# Patient Record
Sex: FEMALE | Race: BLACK OR AFRICAN AMERICAN | NOT HISPANIC OR LATINO | Employment: FULL TIME | URBAN - METROPOLITAN AREA
[De-identification: names, ages, dates, MRNs, and addresses within clinical notes are randomized per-mention and may not be internally consistent; named-entity substitution may affect disease eponyms.]

---

## 2018-02-08 DIAGNOSIS — Z12.31 ENCOUNTER FOR SCREENING MAMMOGRAM FOR BREAST CANCER: Primary | ICD-10-CM

## 2018-08-13 ENCOUNTER — HOSPITAL ENCOUNTER (EMERGENCY)
Facility: HOSPITAL | Age: 63
Discharge: HOME | End: 2018-08-13
Attending: FAMILY MEDICINE
Payer: MEDICAID

## 2018-08-13 VITALS
RESPIRATION RATE: 16 BRPM | TEMPERATURE: 98 F | WEIGHT: 210 LBS | HEART RATE: 62 BPM | DIASTOLIC BLOOD PRESSURE: 78 MMHG | OXYGEN SATURATION: 98 % | BODY MASS INDEX: 31.93 KG/M2 | SYSTOLIC BLOOD PRESSURE: 160 MMHG

## 2018-08-13 DIAGNOSIS — I10 HTN (HYPERTENSION): ICD-10-CM

## 2018-08-13 DIAGNOSIS — Z91.148 NONCOMPLIANCE WITH MEDICATION REGIMEN: ICD-10-CM

## 2018-08-13 DIAGNOSIS — I10 ESSENTIAL HYPERTENSION: Primary | ICD-10-CM

## 2018-08-13 LAB
ALBUMIN SERPL BCP-MCNC: 4.5 G/DL
ALP SERPL-CCNC: 112 U/L
ALT SERPL W/O P-5'-P-CCNC: 16 U/L
ANION GAP SERPL CALC-SCNC: 8 MMOL/L
AST SERPL-CCNC: 23 U/L
BACTERIA #/AREA URNS AUTO: ABNORMAL /HPF
BASOPHILS # BLD AUTO: 0.01 K/UL
BASOPHILS NFR BLD: 0.2 %
BILIRUB SERPL-MCNC: 0.5 MG/DL
BILIRUB UR QL STRIP: NEGATIVE
BUN SERPL-MCNC: 9 MG/DL
CALCIUM SERPL-MCNC: 9 MG/DL
CHLORIDE SERPL-SCNC: 104 MMOL/L
CLARITY UR REFRACT.AUTO: CLEAR
CO2 SERPL-SCNC: 28 MMOL/L
COLOR UR AUTO: YELLOW
CREAT SERPL-MCNC: 0.67 MG/DL
DIFFERENTIAL METHOD: ABNORMAL
EOSINOPHIL # BLD AUTO: 0.1 K/UL
EOSINOPHIL NFR BLD: 1.8 %
ERYTHROCYTE [DISTWIDTH] IN BLOOD BY AUTOMATED COUNT: 13.5 %
EST. GFR  (AFRICAN AMERICAN): >60 ML/MIN/1.73 M^2
EST. GFR  (NON AFRICAN AMERICAN): >60 ML/MIN/1.73 M^2
GLUCOSE SERPL-MCNC: 96 MG/DL
GLUCOSE UR QL STRIP: NEGATIVE
HCT VFR BLD AUTO: 40.8 %
HGB BLD-MCNC: 13.5 G/DL
HGB UR QL STRIP: NEGATIVE
HYPOCHROMIA BLD QL SMEAR: ABNORMAL
KETONES UR QL STRIP: NEGATIVE
LEUKOCYTE ESTERASE UR QL STRIP: ABNORMAL
LYMPHOCYTES # BLD AUTO: 2.1 K/UL
LYMPHOCYTES NFR BLD: 40.4 %
MCH RBC QN AUTO: 29.7 PG
MCHC RBC AUTO-ENTMCNC: 33.1 G/DL
MCV RBC AUTO: 90 FL
MICROSCOPIC COMMENT: ABNORMAL
MONOCYTES # BLD AUTO: 0.4 K/UL
MONOCYTES NFR BLD: 7.5 %
NEUTROPHILS # BLD AUTO: 2.5 K/UL
NEUTROPHILS NFR BLD: 50.1 %
NITRITE UR QL STRIP: NEGATIVE
PH UR STRIP: 7 [PH] (ref 5–8)
PLATELET # BLD AUTO: 141 K/UL
PLATELET BLD QL SMEAR: ABNORMAL
PMV BLD AUTO: ABNORMAL FL
POTASSIUM SERPL-SCNC: 3.4 MMOL/L
PROT SERPL-MCNC: 8.4 G/DL
PROT UR QL STRIP: NEGATIVE
RBC # BLD AUTO: 4.54 M/UL
SODIUM SERPL-SCNC: 140 MMOL/L
SP GR UR STRIP: 1 (ref 1–1.03)
STOMATOCYTES BLD QL SMEAR: PRESENT
URN SPEC COLLECT METH UR: ABNORMAL
UROBILINOGEN UR STRIP-ACNC: NEGATIVE EU/DL
WBC # BLD AUTO: 5.07 K/UL
WBC #/AREA URNS AUTO: 4 /HPF (ref 0–5)

## 2018-08-13 PROCEDURE — 93005 ELECTROCARDIOGRAM TRACING: CPT

## 2018-08-13 PROCEDURE — 99284 EMERGENCY DEPT VISIT MOD MDM: CPT | Mod: 25

## 2018-08-13 PROCEDURE — 93010 ELECTROCARDIOGRAM REPORT: CPT | Mod: ,,, | Performed by: INTERNAL MEDICINE

## 2018-08-13 PROCEDURE — 80053 COMPREHEN METABOLIC PANEL: CPT

## 2018-08-13 PROCEDURE — 81000 URINALYSIS NONAUTO W/SCOPE: CPT

## 2018-08-13 PROCEDURE — 85025 COMPLETE CBC W/AUTO DIFF WBC: CPT

## 2018-08-13 PROCEDURE — 25000003 PHARM REV CODE 250: Performed by: PHYSICIAN ASSISTANT

## 2018-08-13 RX ORDER — AMLODIPINE BESYLATE 5 MG/1
10 TABLET ORAL
Status: COMPLETED | OUTPATIENT
Start: 2018-08-13 | End: 2018-08-13

## 2018-08-13 RX ADMIN — AMLODIPINE BESYLATE 10 MG: 5 TABLET ORAL at 04:08

## 2018-08-13 NOTE — DISCHARGE INSTRUCTIONS
Take prescribed medications from primary care daily as directed.  Go to already scheduled PCP follow-up appointment tomorrow.  Return to ED with any worsening of symptoms or condition.

## 2018-08-13 NOTE — ED PROVIDER NOTES
Encounter Date: 8/13/2018       History     Chief Complaint   Patient presents with    Hypertension     sent from MD office for HTN, original complaint of bee sting .given 0.2 mg of clonidine PTA     Patient is a 62-year-old female presenting to ED today by way of EMS as a transfer from her primary care office with complaints of hypertension.  Patient reports being stung by a bee to her left wrist yesterday which prompted her PCP visit today and while at PCP office blood pressure reading was elevated, patient then referred to ED.  Patient admits to history of hypertension and cholesterol although reports she self discontinued her medications 2 years ago reporting I did not think I needed them anymore.  Patient currently denies any chest pain, shortness of breath, weakness, fatigue, numbness, nausea, vision changes.  Patient does admit mild intermittent headache over the last week and mild dizziness currently.  Patient denies any other physical complaints at this time.  No alleviating factors noted.      The history is provided by the patient and the EMS personnel.     Review of patient's allergies indicates:  No Known Allergies  Past Medical History:   Diagnosis Date    Arthritis     Hypertension     Pure hypercholesterolemia 10/19/2016     Past Surgical History:   Procedure Laterality Date    TUBAL LIGATION       Family History   Problem Relation Age of Onset    Cancer Mother     Asbestos Father     Heart disease Son     Hypertension Son      Social History     Tobacco Use    Smoking status: Never Smoker    Smokeless tobacco: Never Used   Substance Use Topics    Alcohol use: No    Drug use: No     Review of Systems   Constitutional: Negative for appetite change, chills, diaphoresis, fatigue and fever.   HENT: Negative for congestion, ear pain, facial swelling, nosebleeds, sinus pain, sore throat and trouble swallowing.    Eyes: Negative for photophobia, pain and visual disturbance.   Respiratory:  Negative for cough, choking, shortness of breath, wheezing and stridor.    Cardiovascular: Negative for chest pain, palpitations and leg swelling.   Gastrointestinal: Negative for abdominal pain, diarrhea, nausea and vomiting.   Endocrine: Negative.    Genitourinary: Negative for difficulty urinating, dyspareunia, dysuria, hematuria and pelvic pain.   Musculoskeletal: Negative for back pain, myalgias and neck pain.   Skin: Negative for rash and wound.   Allergic/Immunologic: Negative.    Neurological: Positive for dizziness and headaches. Negative for tremors, seizures, syncope, facial asymmetry, speech difficulty, weakness, light-headedness and numbness.   Hematological: Negative.    Psychiatric/Behavioral: Negative.        Physical Exam     Initial Vitals [08/13/18 1605]   BP Pulse Resp Temp SpO2   (!) 197/102 66 18 98.3 °F (36.8 °C) 96 %      MAP       --         Physical Exam    Nursing note and vitals reviewed.  Constitutional: She appears well-developed and well-nourished. She is not diaphoretic. No distress.   Patient is a 62-year-old female sitting upright in bed in no acute distress, nontoxic, AAO x4, responding appropriately to questioning and breathing comfortably on room air.   HENT:   Head: Normocephalic and atraumatic.   Right Ear: External ear normal.   Left Ear: External ear normal.   Nose: Nose normal.   Mouth/Throat: Oropharynx is clear and moist. No oropharyngeal exudate.   Eyes: Conjunctivae and EOM are normal. Pupils are equal, round, and reactive to light.   Neck: Normal range of motion. Neck supple. No JVD present.   Cardiovascular: Normal rate, regular rhythm, normal heart sounds and intact distal pulses.   Pulmonary/Chest: Effort normal and breath sounds normal. No accessory muscle usage or stridor. No tachypnea. No respiratory distress. She has no decreased breath sounds. She has no wheezes. She exhibits no tenderness.   Abdominal: Soft. Bowel sounds are normal. She exhibits no distension.  There is no tenderness. There is no rebound and no guarding.   Musculoskeletal: Normal range of motion. She exhibits no edema or tenderness.   Lymphadenopathy:     She has no cervical adenopathy.   Neurological: She is alert and oriented to person, place, and time. She has normal strength. She displays no tremor. No sensory deficit. She exhibits normal muscle tone. She displays a negative Romberg sign. She displays no seizure activity. Coordination normal. GCS eye subscore is 4. GCS verbal subscore is 5. GCS motor subscore is 6.   Neurovascularly intact. NIH = 0.   Skin: Skin is warm and dry. Capillary refill takes less than 2 seconds. No rash noted. No erythema.         ED Course   Procedures  Labs Reviewed   CBC W/ AUTO DIFFERENTIAL - Abnormal; Notable for the following components:       Result Value    Platelets 141 (*)     All other components within normal limits   COMPREHENSIVE METABOLIC PANEL - Abnormal; Notable for the following components:    Potassium 3.4 (*)     All other components within normal limits   URINALYSIS - Abnormal; Notable for the following components:    Leukocytes, UA 3+ (*)     All other components within normal limits   URINALYSIS MICROSCOPIC - Abnormal; Notable for the following components:    Bacteria, UA Many (*)     All other components within normal limits     EKG Readings: (Independently Interpreted)   EKG done at 4:33 p.m., rate 63, , QRS 86.  Sinus rhythm with occasional PVCs, slightly prolonged QT, no STEMI present.       Imaging Results          X-Ray Chest PA And Lateral (Final result)  Result time 08/13/18 17:03:05    Final result by Fei Askew MD (08/13/18 17:03:05)                 Impression:      No acute abnormality.      Electronically signed by: Fei Askew  Date:    08/13/2018  Time:    17:03             Narrative:    EXAMINATION:  XR CHEST PA AND LATERAL    CLINICAL HISTORY:  Essential (primary) hypertension    TECHNIQUE:  PA and lateral views of the chest  "were performed.    COMPARISON:  None    FINDINGS:  The lungs are clear, with normal appearance of pulmonary vasculature and no pleural effusion or pneumothorax.    The cardiac silhouette is minimally enlarged.  The hilar and mediastinal contours are unremarkable.    Bones are intact.                                 Medical Decision Making:   Initial Assessment:   Patient is a 62-year-old female presenting to ED today by way of EMS as a transfer from her primary care office with complaints of hypertension.  Patient reports being stung by a bee to her left wrist yesterday which prompted her PCP visit today and while at PCP office blood pressure reading was elevated, patient then referred to ED.  Patient admits to history of hypertension and cholesterol although reports she self discontinued her medications 2 years ago reporting "I did not think I needed them anymore."  Patient currently denies any chest pain, shortness of breath, weakness, fatigue, numbness, nausea, vision changes.  Patient does admit mild intermittent headache over the last week and mild dizziness currently.  Patient denies any other physical complaints at this time.  No alleviating factors noted.  Differential Diagnosis:   Uncontrolled HTN  Medication noncompliance  End organ damage secondary to HTN  Clinical Tests:   Lab Tests: Ordered and Reviewed  Medical Tests: Ordered and Reviewed  ED Management:  Discussed care plan with patient and family members at bedside.  Lab workup overall unremarkable. Blood pressure now stable and improved.  Patient remains asymptomatic currently with fully intact initial and re-exams MSK and neurovascular.  Patient reports that she has a follow-up appointment scheduled with PCP for tomorrow and that a family member has already gone to  her newly prescribed hypertension medications from primary care.  Emphasized the patient the importance of starting this medications in taking daily as directed by PCP.  " Thoroughly educated patient on ED return precautions.  Patient is stable, no acute distress, nontoxic, neurovascular intact, vital signs stable, condition improved, all questions answered.                   ED Course as of Aug 13 1733   Mon Aug 13, 2018   1729 Lab reviewed, leukocytes present, nitrite negative, asymptomatic.  Likely contaminant.  Culture sent off for further evaluation. Bacteria, UA: (!) Many [MC]      ED Course User Index  [MC] Sarah Dooley PA-C     Clinical Impression:   The primary encounter diagnosis was Essential hypertension. Diagnoses of Noncompliance with medication regimen, HTN (hypertension), and HTN (hypertension) were also pertinent to this visit.                             Sarah Dooley PA-C  08/13/18 2340

## 2018-08-13 NOTE — ED TRIAGE NOTES
"Pt states " I went to the clinic for a bee sting and they sent me here because my pressure was high. I have high blood pressure but I stopped talking my meds 2 years ago." pt denies cp/ sob or any other symptoms   "

## 2022-12-27 ENCOUNTER — TELEPHONE (OUTPATIENT)
Dept: GASTROENTEROLOGY | Facility: CLINIC | Age: 67
End: 2022-12-27
Payer: MEDICAID

## 2022-12-27 NOTE — LETTER
December 27, 2022    Radha TIRADO Aditiramilarajni  [address]  [place] [address]             [facility]  [address], [place]   [place] [address]  Phone: [phone]  Fax: [phone] Dear Ms. Cortez:    We have attempted to contact you to schedule a screening colonoscopy that was ordered by your doctor. Please contact the office to schedule at [phone].       If you have any questions or concerns, please don't hesitate to call.    Sincerely,        Jessica Mancia MD

## 2023-03-21 ENCOUNTER — TELEPHONE (OUTPATIENT)
Dept: GASTROENTEROLOGY | Facility: CLINIC | Age: 68
End: 2023-03-21
Payer: MEDICARE

## 2023-03-21 NOTE — TELEPHONE ENCOUNTER
Patient called to schedule a colonoscopy. Patient is scheduled to see a cardiologist on 4/18/2023. Per Dr. Mancia, patient will need to wait until after that work up to schedule. Message left on patients voice mail.

## 2023-03-21 NOTE — TELEPHONE ENCOUNTER
----- Message from Melina Calderón sent at 3/21/2023 12:15 PM CDT -----  Regarding: Colonoscopy Screening  Pt calling to schedule Colonoscopy Screening [phone]

## 2023-10-19 ENCOUNTER — TELEPHONE (OUTPATIENT)
Dept: GASTROENTEROLOGY | Facility: CLINIC | Age: 68
End: 2023-10-19
Payer: MEDICARE

## 2023-10-19 NOTE — TELEPHONE ENCOUNTER
Attempted to contact patient to schedule a colonoscopy. No good numbers in patients chart. Will send letter.

## 2023-12-22 ENCOUNTER — TELEPHONE (OUTPATIENT)
Dept: GASTROENTEROLOGY | Facility: CLINIC | Age: 68
End: 2023-12-22
Payer: MEDICARE

## 2023-12-22 NOTE — TELEPHONE ENCOUNTER
Attempted to contact patient to schedule a colonoscopy. Patients phone is disconnected. Will mail letter.